# Patient Record
Sex: FEMALE | Race: BLACK OR AFRICAN AMERICAN | NOT HISPANIC OR LATINO | Employment: UNEMPLOYED | URBAN - METROPOLITAN AREA
[De-identification: names, ages, dates, MRNs, and addresses within clinical notes are randomized per-mention and may not be internally consistent; named-entity substitution may affect disease eponyms.]

---

## 2017-03-23 ENCOUNTER — TELEPHONE (OUTPATIENT)
Dept: OBSTETRICS AND GYNECOLOGY | Facility: CLINIC | Age: 32
End: 2017-03-23

## 2018-09-13 PROBLEM — O09.521 ELDERLY MULTIGRAVIDA IN FIRST TRIMESTER: Status: ACTIVE | Noted: 2018-09-13

## 2018-09-13 PROBLEM — O36.4XX0 IUFD AT 20 WEEKS OR MORE OF GESTATION: Status: ACTIVE | Noted: 2018-09-13

## 2018-11-15 PROBLEM — O09.299 PRIOR PREGNANCY WITH FETAL DEMISE: Status: ACTIVE | Noted: 2018-09-13

## 2018-11-15 PROBLEM — O10.919 CHRONIC HYPERTENSION AFFECTING PREGNANCY: Status: ACTIVE | Noted: 2018-11-15

## 2018-11-15 PROBLEM — O99.321 DRUG USE AFFECTING PREGNANCY IN FIRST TRIMESTER: Status: ACTIVE | Noted: 2018-11-15

## 2018-11-16 DIAGNOSIS — Z36.89 ENCOUNTER FOR FETAL ANATOMIC SURVEY: ICD-10-CM

## 2018-11-16 DIAGNOSIS — Z87.59 HISTORY OF FETAL DEMISE, NOT CURRENTLY PREGNANT: Primary | ICD-10-CM

## 2018-12-07 DIAGNOSIS — Z36.89 ENCOUNTER FOR FETAL ANATOMIC SURVEY: Primary | ICD-10-CM

## 2018-12-10 ENCOUNTER — HOSPITAL ENCOUNTER (OUTPATIENT)
Dept: PERINATAL CARE | Facility: HOSPITAL | Age: 33
Discharge: HOME | End: 2018-12-10
Attending: OBSTETRICS & GYNECOLOGY
Payer: MEDICAID

## 2018-12-10 VITALS — BODY MASS INDEX: 55.24 KG/M2 | WEIGHT: 293 LBS | DIASTOLIC BLOOD PRESSURE: 80 MMHG | SYSTOLIC BLOOD PRESSURE: 126 MMHG

## 2018-12-10 DIAGNOSIS — E66.01 MORBID OBESITY: ICD-10-CM

## 2018-12-10 DIAGNOSIS — Z87.59 HISTORY OF PLACENTA ABRUPTION: ICD-10-CM

## 2018-12-10 DIAGNOSIS — O26.90 PREGNANCY, COMPLICATIONS OF: Primary | ICD-10-CM

## 2018-12-10 DIAGNOSIS — O09.522 ADVANCED MATERNAL AGE IN MULTIGRAVIDA, SECOND TRIMESTER: ICD-10-CM

## 2018-12-10 DIAGNOSIS — O10.912 CHRONIC HYPERTENSION COMPLICATING OR REASON FOR CARE DURING PREGNANCY, SECOND TRIMESTER: ICD-10-CM

## 2018-12-10 DIAGNOSIS — Z36.89 ENCOUNTER FOR FETAL ANATOMIC SURVEY: ICD-10-CM

## 2018-12-10 PROCEDURE — 76811 OB US DETAILED SNGL FETUS: CPT | Mod: 26,,, | Performed by: OBSTETRICS & GYNECOLOGY

## 2018-12-10 PROCEDURE — 76811 OB US DETAILED SNGL FETUS: CPT

## 2018-12-10 PROCEDURE — 99204 OFFICE O/P NEW MOD 45 MIN: CPT | Mod: 25,TH,, | Performed by: OBSTETRICS & GYNECOLOGY

## 2018-12-10 RX ORDER — ALBUTEROL SULFATE 0.83 MG/ML
2.5 SOLUTION RESPIRATORY (INHALATION) EVERY 6 HOURS PRN
Status: ON HOLD | COMMUNITY
End: 2019-03-25

## 2018-12-11 NOTE — PROGRESS NOTES
"Indication  ========    Consultation. Fetal anatomy survey.    History  ======    Risk Factors  Details: Asthma  Details: Obesity  Details: hx placental abruption    Maternal Assessment  =================    Weight 137 kg  Weight (lb) 302 lb  BP syst 126 mmHg  BP diast 80 mmHg    Method  ======    Transabdominal ultrasound examination, 2D Color Doppler, Voluson E6. View: Suboptimal view: limited by fetal position.    Pregnancy  =========    Ahn pregnancy. Number of fetuses: 1.    Dating  ======    GA by "stated dating" 22 w + 5 d  SEBAS by "stated dating": 4/10/2019  Ultrasound examination on: 12/10/2018  GA by U/S based upon: AC, BPD, Femur, HC  GA by U/S 22 w + 5 d  SEBAS by U/S: 4/10/2019  Assigned: Dating performed on 12/10/2018, based on the external assessment  Assigned GA 22 w + 5 d  Assigned SEBAS: 4/10/2019    General Evaluation  ==============    Cardiac activity: present.  bpm.  Fetal movements: visualized.  Presentation: cephalic.  Placenta:  Placental site: anterior. Distance from internal cervical os 53 mm.  Umbilical cord: Cord vessels: 3 vessel cord. Cord insertion: placental insertion: normal.  Amniotic fluid: Amount of AF: normal amount.    Fetal Biometry  ============    Fetal Biometry  BPD 52.9 mm 22w 0d Hadlock  OFD 72.1 mm 24w 0d [place]  .7 mm 22w 2d Hadlock  .1 mm 23w 5d Hadlock  Femur 39.3 mm 22w 5d Hadlock  Cerebellum tr 23.8 mm 22w 6d Espinosa  CM 3.2 mm  Nuchal fold 4.07 mm  Humerus 35.1 mm 22w 0d [place]   g 51% Lawrence  Calculated by: Hadlock (BPD-HC-AC-FL)  EFW (lb) 1 lb  EFW (oz) 4 oz  Cephalic index 0.73  HC / AC 1.06  FL / BPD 0.74  FL / AC 0.21   bpm  Head / Face / Neck   4.6 mm    Fetal Anatomy  ============    Cranium: normal  Lateral ventricles: normal  Choroid plexus: normal  Midline falx: normal  Cavum septi pellucidi: normal  Cerebellum: normal  Cisterna " magna: normal  Vermis: normal  Neck: suboptimal  Lips: normal  Nose: normal  Profile: absent nasal bone, normal chin  RVOT: normal  LVOT: suboptimal  4-chamber view: 4-chamber normal, septum normal  Situs: normal  Aortic arch: suboptimal  Ductal arch: suboptimal  SVC: suboptimal  IVC: suboptimal  3-vessel view: normal  3-vessel-trachea view: suboptimal  Rt lung: normal  Lt lung: normal  Diaphragm: normal  Cord insertion: normal  Stomach: normal  Kidneys: normal  Bladder: normal  Genitals: normal  Liver: normal  Bowel: normal  Cervical spine: normal  Thoracic spine: normal  Lumbar spine: normal  Sacral spine: normal  Rt arm: normal  Lt arm: normal  Rt leg: normal  Lt leg: normal  Position of hands: normal  Position of feet: normal  Gender: female  Wants to know gender: yes    Maternal Structures  ===============    Uterus / Cervix  Approach: Transabdominal  Cervical length 42.1 mm  Ovaries / Tubes / Adnexa  Rt ovary: Suboptimal  Lt ovary: Suboptimal    Consultation  ==========    Type: CHTN/AMA/history of 24 week loss demise/PTD delivery.  Ms. Lopez is a 33y/o soon to be 34y/o  at 22 and 5 weeks based on LMP agreeing with 10 week scan who presents for consult  due to CHTN/AMA/history of 24 week IUFD and  delivery.  The patient was unaware of her diagnosis of CHTN-this appears to have been diagnosed this pregnancy.  Patient reports that she has had 3 or 4 miscarriages and she included the 24 week IUFD in this count. They have not been consecutive but  have been between pregnancies. It was unclear if a workup has been done. With respect to her 24 week delivery, the patient reports that she  did not know she was pregnant and had an episode of heavy bleeding which she thought was her menses and then had liquid noted. An IUFD  was diagnosed. She cannot remember if additional medications were given or if she labored on her own. She reported that they commented the  fetus had been  for an extended  time in utero based on appearance and the early delivery was thought to be her body's natural way of  passing the demised fetus. She was not on progesterone subsequent term deliveries. She reports she was told she had an abruption.    PMHx: CHTN:-diagnosed this pregnancy-no meds  Asthma: no regular meds-has used albuterol daily for a few weeks-no current wheezing-has PCP  who is aware she is pregnant-no  aspirin  sensitivity  AMA  PSHX: d and c, cholecystectomy  POBHx: 3 SAB (1 month of pregnancy), 24 week IUFD with later abruption, rupture of membranes and delivery  then:  40 week  6 pounds 14 ounces  40 week  7 pounds 12 ounces  39 week  6 pounds 13.6 ounces  Social: Denies smoking, alcohol, illicit drug use  Meds; PNV, albuterol  Family history birth defects: None-her maternal grandmother had 14 pregnancies and has had 7 living children and 7 miscarriages (no specific  reason), no sickle cell disease  Allergy: bactrim-hives    Quad negative  CF negative  hgb elec negative            1. IUP at 22 and 5    2. CHTN:She was counseled on maternal/fetal risks associated with CHTN during pregnancy. These include but are not limited to worsening  hypertension, superimposed preeclampsia (which may necessitate  delivery), placental abruption,  delivery and low but  increased likelihood of stroke, renal failure, and cardiac failure. Fetal risks include miscarriage, stillbirth, growth restriction, prematurity and   death. Risks are increased with evidence of renal dysfunction (serum creatinine >1.1 mg/dL or baseline proteinuria).  -We have scheduled patient for a follow up ultrasound 5 weeks for growth/suboptimal fetal anatomy.  - Serial ultrasounds for fetal growth starting at 24-28 weeks gestation.  - Baseline labs: CMP, CBC, Protein/creatinine ratio or 24 hour urine for protein and creatinine clearance are recommended. If the patient has a  urine p/c ratio of greater than or equal  to 0.3, then a 24 hour collection should be performed. If the patients creatinine is greater than or equal  to 1.1, this should be repeated each trimester.  -Glucose screen at this time secondary to obesity and if negative repeat at 24-28 wks gestation. Also recommend primary ob check hgba1c.  - Close monitoring of patient's blood pressures:  If BP < 160 mmHg systolic or 105 mmHg diastolic and no evidence of end-organ damage, no antihypertensive therapy suggested  If patient on antihypertensive medication, it is suggested that BP levels be maintained between 120 mmHg systolic and 80mmHg diastolic  and  160 mmHg systolic and 105 mmHg diastolic  - Fetal surveillance recommended twice weekly as per IUFD section  - Timing of delivery: 38 weeks with earlier delivery depending on clinical scenario  -With morbid obesity and CHTN recommend obtaining a baseline EKG and a maternal echocardiogram.  -Begin baby aspirin 81mg PO daily for preeclampsia prevention at 12-14 weeks gestation if not aspirin sensitive  - Opthalmic exam if any concerns.  - Postpartum, the patient should be placed on her prepregnancy regimen unless other therapy is thought to be optimal. For those newly  diagnosed during pregnancy without other comorbidities, antihypertensive therapy should be implemented for pressures greater than or equal to  150 systolic or greater than or equal to 100 diastolic.  The patient's blood pressures are currently controlled    3. History of IUFD: Based on the patient's account, the demise then lead to early delivery as likely a natural response to prolonged demise. If  there was truly an abruption, we discussed the recurrence risk of abruption in subsequent pregnancies. We also discussed that CHTN  increases the risk of abruption. Patient did not report an autopsy. She is aware of the option of NIPT and diagnostic testing. Recommend  primary ob obtain records of prior demise. Recommend serial growth ultrasounds. Recommend  twice weekly  fetal surveillance with  primary ob with BPP alternating with NST on Monday/Thursday or Tuesday/Friday schedule starting at 30 weeks. If BP is well controlled,  recommend delivery at 38 weeks. Earlier delivery may be required depending on clinical scenario. Obesity also increases the risk of adverse  pregnancy outcomes. Counseling was limited by lack of records. Based on the patient's account, she is not a candidate for transvaginal  cervical lengths and 17 OHPC injections. REcommend antiphospholipid antibody syndrome workup if this has not been done.    4. Absent nasal bone on ultrasound:We discussed in certain populations that this can be more commonly seen as a normal variant. However,  we did discuss the potential association with Trisomy 21. The patient had a low risk quad screen. We discussed the options of NIPT and  amniocentesis and the patient declined both.    5. AMA:We discussed the increased risk of chromosomal abnormalities with advanced maternal age and discussed the patients age related  risk of chromosomal abnormalities. We discussed screening strategies to assess for chromosomal abnormalities.  We reviewed the risks, benefits, alternatives, limitations, and sensitivities of the sequential screen. We reviewed the risks, benefits,  alternatives, limitations, and sensitivities of the quad screen. We discussed the risks, benefits, alternatives, limitations, sensitivities of NIPT.  We also discussed diagnostic strategies to assess for chromosomal abnormalities. The patient was advised of the risks, benefits, alternatives,  and limitations of amniocentesis and the 1 in 1000 risk of pregnancy related complications including pregnancy loss and declined. We  discussed the limitations of ultrasound in detecting chromosomal  abnormalities such as Trisomy 21. The patient elected to proceed with follow up ultrasound only.    -We also reviewed that AMA is associated with an increased risk  of adverse pregnancy outcomes such as miscarriage, ectopic pregnancy,  diabetes, hypertension, and other adverse outcomes. There is also an increased risk of stillbirth, particularly in women age 40 and above.    -Patient will need serial growth and  fetal surveillance due to comorbid issues.  -Recommend daily baby asa if no aggravation of asthma symptoms.      Also noted but not asked to consult on:    6. Asthma: Recommend primary ob confirm patient has follow up with PCP. Patient reports recent Albuterol use was due to cold weather and  typically resolves. Patient needs maintenance therapy for asthma if usage of albuterol is more than 2 times per week.    7. Multiple miscarriages: Recommend primary ob confirm patient has had APLS workup (lupus anticoagulant, beta 2 glycoprotein IgG and IgM,  and anticardiolipin IgG and IgM), as well as testing for diabetes and thyroid disease. If patient has not had parental karyotypes, these should  also be offered by primary ob. Uterine cavity assessment by primary ob could be considered outside of pregnancy.    8. Obesity-BMI is listed as 55 in EPIC. Obesity increases the risk of adverse pregnancy outcomes. If further consultation is desired regarding  obesity, please notify our office. Anesthesia consult would be recommended.    45 minutes was spent in face to face time with greater than half of that time spent in counseling and coordination of care.    Impression  =========    Ahn live intrauterine pregnancy.  Biometry agrees with the clinical dating.  Normal amniotic fluid volume by qualitative assessment.  Fetal nasal bone appears absent.  Some of the fetal anatomy was suboptimally visualized. The fetal anatomy that was seen appeared normal.  Placenta is anterior without previa.  Transabdominal cervical length is normal.    Recommendation  ==============    Follow up ultrasound in 5 weeks for growth/suboptimal fetal anatomy.  Patient declines  NIPT/amniocentesis.  Recommend primary ob order antiphospholipid antibody syndrome workup and offer recurrent pregnancy loss workup as above.  Recommend patient have baseline preeclampsia labs, baseline assessment of urine protein and EKG and maternal echo to be ordered by  primary ob. Recommend primary ob order GDM screening and hgba1c and if if reassuring, repeat diabetes screen at 24 to 28 weeks.  Recommend primary ob schedule anesthesia consult  Recommend patient take daily baby asa if no difficulties occur with her asthma.  Recommend primary ob confirm patient scheduled follow up with PCP due to recent increase in Albuterol use.  Recommend serial growth ultrasounds.  Recommend primary ob schedule twice weekly  fetal surveillance beginning at 30 weeks (BPP alternating with NST).  Delivery 38 weeks or earlier if indicated.  Recommend primary ob obtain records of demise.  See above consultation note.

## 2019-01-14 ENCOUNTER — HOSPITAL ENCOUNTER (OUTPATIENT)
Dept: PERINATAL CARE | Facility: HOSPITAL | Age: 34
Discharge: HOME | End: 2019-01-14
Attending: OBSTETRICS & GYNECOLOGY
Payer: MEDICAID

## 2019-01-14 VITALS — BODY MASS INDEX: 53.61 KG/M2 | SYSTOLIC BLOOD PRESSURE: 119 MMHG | DIASTOLIC BLOOD PRESSURE: 70 MMHG | WEIGHT: 293 LBS

## 2019-01-14 DIAGNOSIS — O26.90 PREGNANCY, COMPLICATIONS OF: Primary | ICD-10-CM

## 2019-01-14 DIAGNOSIS — O26.90 PREGNANCY, COMPLICATIONS OF: ICD-10-CM

## 2019-01-14 PROCEDURE — 76816 PR  US,PREGNANT UTERUS,F/U,TRANSABD APP: ICD-10-PCS | Mod: 26,,, | Performed by: OBSTETRICS & GYNECOLOGY

## 2019-01-14 PROCEDURE — 99212 OFFICE O/P EST SF 10 MIN: CPT | Mod: 25,TH,, | Performed by: OBSTETRICS & GYNECOLOGY

## 2019-01-14 PROCEDURE — 76816 OB US FOLLOW-UP PER FETUS: CPT

## 2019-01-14 PROCEDURE — 99212 PR OFFICE/OUTPT VISIT, EST, LEVL II, 10-19 MIN: ICD-10-PCS | Mod: 25,TH,, | Performed by: OBSTETRICS & GYNECOLOGY

## 2019-01-14 PROCEDURE — 76816 OB US FOLLOW-UP PER FETUS: CPT | Mod: 26,,, | Performed by: OBSTETRICS & GYNECOLOGY

## 2019-01-14 NOTE — PROGRESS NOTES
"Indication  ========    Follow-up evaluation of anatomy. Evaluation of fetal growth.    History  ======    Risk Factors  Details: Asthma  Details: Obesity  Details: hx placental abruption    Maternal Assessment  =================    Weight 133 kg  Weight (lb) 293 lb  BP syst 119 mmHg  BP diast 70 mmHg    Method  ======    2D Color Doppler, , Voluson E6, Transabdominal ultrasound examination. View: Sufficient.    Pregnancy  =========    Ahn pregnancy. Number of fetuses: 1.    Dating  ======    Cycle: regular cycle  GA by "stated dating" 27 w + 5 d  SEBAS by "stated dating": 4/10/2019  Ultrasound examination on: 1/14/2019  GA by U/S based upon: AC, BPD, Femur, HC  GA by U/S 27 w + 4 d  SEBAS by U/S: 4/11/2019  Assigned: Dating performed on 12/10/2018, based on the external assessment  Assigned GA 27 w + 5 d  Assigned SEBAS: 4/10/2019    General Evaluation  ==============    Cardiac activity: present.  bpm.  Fetal movements: visualized.  Presentation: cephalic.  Placenta: anterior.  Amniotic fluid: MVP 6.7 cm.    Fetal Biometry  ============    Fetal Biometry  BPD 68.2 mm 27w 3d Hadlock  OFD 94.1 mm 30w 3d [place]  .5 mm 28w 4d Hadlock  .2 mm 27w 0d Hadlock  Femur 51.5 mm 27w 4d Hadlock  EFW 1,065 g 39% Lawrence  Calculated by: Hadlock (BPD-HC-AC-FL)  EFW (lb) 2 lb  EFW (oz) 6 oz  Cephalic index 0.72  HC / AC 1.16  FL / BPD 0.76  FL / AC 0.23  MVP 6.7 cm   bpm    Fetal Anatomy  ============    Cranium: normal  Neck: normal  Profile: Absent nasal bone, chin normal  4-chamber view: documented previously  LVOT: normal  Aortic arch: normal  Ductal arch: normal  SVC: normal  IVC: normal  3-vessel-trachea view: normal  Stomach: normal  Kidneys: normal  Bladder: normal  Rt renal artery: views limited due to position, appears present  Lt renal artery: visualized  Gender: female  Wants to know gender: yes  Other: A full anatomic survey has been previously performed.    Consultation  ==========    Type: " Follow up.  No complaints  Patient aware of absent fetal nasal bone.  Hgba1c 4.7 and 1 hour glucola 72  Hgb 9.7, plt 308, cr 0.47, AST 16, ALT 22-recommend primary ob perform iron studies  No 24 hour urine or APLS labs noted or cardiology info noted.  Recommend primary ob review prior consult notes for detailed recommendations.    10 minutes was spent in face to face time with greater than half of that time spent in counseling and coordination of care.    Impression  =========    Ahn live intrauterine pregnancy.  Overall normal fetal growth.  Normal amniotic fluid volume.  The previously suboptimally visualized fetal anatomy was seen today and appeared normal.  Known absent fetal nasal bone.  The remainder of the limited fetal anatomy appeared normal.  Addendum: Right renal artery appears present-views limited by position-kidneys present on prior scan as well but image not saved.    Recommendation  ==============    Follow up ultrasound in 4 weeks for growth and fluid on ultrasound only day.  Serial growth recommended. Recommend at least one more growth scan be done through our MFM office.   fetal surveillance at 30 weeks through primary ob office as per prior consult.  Recommend primary ob review prior consult note for detailed recommendations.  Recommend primary ob perform iron studies.

## 2019-03-07 PROBLEM — O99.013 ANEMIA AFFECTING PREGNANCY IN THIRD TRIMESTER: Status: ACTIVE | Noted: 2019-03-07

## 2019-03-12 DIAGNOSIS — O26.90 PREGNANCY, COMPLICATIONS OF: Primary | ICD-10-CM

## 2019-03-19 ENCOUNTER — HOSPITAL ENCOUNTER (OUTPATIENT)
Dept: PERINATAL CARE | Facility: HOSPITAL | Age: 34
Discharge: HOME | End: 2019-03-19
Attending: OBSTETRICS & GYNECOLOGY
Payer: MEDICAID

## 2019-03-19 DIAGNOSIS — O26.90 PREGNANCY, COMPLICATIONS OF: ICD-10-CM

## 2019-03-19 DIAGNOSIS — Z87.59 HISTORY OF FETAL DEMISE, NOT CURRENTLY PREGNANT: ICD-10-CM

## 2019-03-19 DIAGNOSIS — Z36.89 ENCOUNTER FOR ULTRASOUND TO CHECK FETAL GROWTH: ICD-10-CM

## 2019-03-19 PROBLEM — Z34.90 PREGNANCY: Status: ACTIVE | Noted: 2019-03-19

## 2019-03-19 PROCEDURE — 76819 PR US, OB, FETAL BIOPHYSICAL, W/O NST: ICD-10-PCS | Mod: 26,,, | Performed by: OBSTETRICS & GYNECOLOGY

## 2019-03-19 PROCEDURE — 76819 FETAL BIOPHYS PROFIL W/O NST: CPT

## 2019-03-19 PROCEDURE — 76816 OB US FOLLOW-UP PER FETUS: CPT

## 2019-03-19 PROCEDURE — 76816 PR  US,PREGNANT UTERUS,F/U,TRANSABD APP: ICD-10-PCS | Mod: 26,,, | Performed by: OBSTETRICS & GYNECOLOGY

## 2019-03-19 PROCEDURE — 76819 FETAL BIOPHYS PROFIL W/O NST: CPT | Mod: 26,,, | Performed by: OBSTETRICS & GYNECOLOGY

## 2019-03-19 PROCEDURE — 76816 OB US FOLLOW-UP PER FETUS: CPT | Mod: 26,,, | Performed by: OBSTETRICS & GYNECOLOGY

## 2019-03-22 PROBLEM — Z34.90 PREGNANCY: Status: RESOLVED | Noted: 2019-03-19 | Resolved: 2019-03-22

## 2019-03-25 ENCOUNTER — HOSPITAL ENCOUNTER (INPATIENT)
Facility: HOSPITAL | Age: 34
LOS: 2 days | Discharge: HOME | End: 2019-03-27
Attending: OBSTETRICS & GYNECOLOGY | Admitting: OBSTETRICS & GYNECOLOGY
Payer: MEDICAID

## 2019-03-25 ENCOUNTER — ANESTHESIA (OUTPATIENT)
Dept: OBSTETRICS AND GYNECOLOGY | Facility: HOSPITAL | Age: 34
End: 2019-03-25
Payer: MEDICAID

## 2019-03-25 ENCOUNTER — ANESTHESIA EVENT (OUTPATIENT)
Dept: OBSTETRICS AND GYNECOLOGY | Facility: HOSPITAL | Age: 34
End: 2019-03-25
Payer: MEDICAID

## 2019-03-25 DIAGNOSIS — O09.299 PRIOR PREGNANCY WITH FETAL DEMISE: ICD-10-CM

## 2019-03-25 DIAGNOSIS — O10.919 CHRONIC HYPERTENSION AFFECTING PREGNANCY: ICD-10-CM

## 2019-03-25 DIAGNOSIS — O99.321 DRUG USE AFFECTING PREGNANCY IN FIRST TRIMESTER: ICD-10-CM

## 2019-03-25 DIAGNOSIS — E66.01 MORBID OBESITY: ICD-10-CM

## 2019-03-25 DIAGNOSIS — O09.523 ELDERLY MULTIGRAVIDA IN THIRD TRIMESTER: ICD-10-CM

## 2019-03-25 DIAGNOSIS — O99.013 ANEMIA AFFECTING PREGNANCY IN THIRD TRIMESTER: ICD-10-CM

## 2019-03-25 DIAGNOSIS — Z34.90 ENCOUNTER FOR INDUCTION OF LABOR: ICD-10-CM

## 2019-03-25 DIAGNOSIS — Z87.59 HISTORY OF PLACENTA ABRUPTION: ICD-10-CM

## 2019-03-25 LAB
ABO + RH BLD: NORMAL
BASOPHILS # BLD AUTO: 0.01 K/UL (ref 0–0.2)
BASOPHILS NFR BLD: 0.1 % (ref 0–1.9)
BLD GP AB SCN CELLS X3 SERPL QL: NORMAL
DIFFERENTIAL METHOD: ABNORMAL
EOSINOPHIL # BLD AUTO: 0 K/UL (ref 0–0.5)
EOSINOPHIL NFR BLD: 0.3 % (ref 0–8)
ERYTHROCYTE [DISTWIDTH] IN BLOOD BY AUTOMATED COUNT: 15.4 % (ref 11.5–14.5)
HCT VFR BLD AUTO: 34.3 % (ref 37–48.5)
HGB BLD-MCNC: 10.5 G/DL (ref 12–16)
LYMPHOCYTES # BLD AUTO: 2.3 K/UL (ref 1–4.8)
LYMPHOCYTES NFR BLD: 24.8 % (ref 18–48)
MCH RBC QN AUTO: 25.9 PG (ref 27–31)
MCHC RBC AUTO-ENTMCNC: 30.6 G/DL (ref 32–36)
MCV RBC AUTO: 85 FL (ref 82–98)
MONOCYTES # BLD AUTO: 0.8 K/UL (ref 0.3–1)
MONOCYTES NFR BLD: 8.7 % (ref 4–15)
NEUTROPHILS # BLD AUTO: 6 K/UL (ref 1.8–7.7)
NEUTROPHILS NFR BLD: 66.1 % (ref 38–73)
PLATELET # BLD AUTO: 308 K/UL (ref 150–350)
PMV BLD AUTO: 11.3 FL (ref 9.2–12.9)
RBC # BLD AUTO: 4.05 M/UL (ref 4–5.4)
WBC # BLD AUTO: 9.08 K/UL (ref 3.9–12.7)

## 2019-03-25 PROCEDURE — 25000003 PHARM REV CODE 250: Performed by: OBSTETRICS & GYNECOLOGY

## 2019-03-25 PROCEDURE — 11000001 HC ACUTE MED/SURG PRIVATE ROOM

## 2019-03-25 PROCEDURE — 27800517 HC TRAY,EPIDURAL-CONTINUOUS: Performed by: ANESTHESIOLOGY

## 2019-03-25 PROCEDURE — 72100002 HC LABOR CARE, 1ST 8 HOURS

## 2019-03-25 PROCEDURE — 86901 BLOOD TYPING SEROLOGIC RH(D): CPT

## 2019-03-25 PROCEDURE — 27200710 HC EPIDURAL INFUSION PUMP SET: Performed by: ANESTHESIOLOGY

## 2019-03-25 PROCEDURE — 63600175 PHARM REV CODE 636 W HCPCS: Performed by: OBSTETRICS & GYNECOLOGY

## 2019-03-25 PROCEDURE — 62326 NJX INTERLAMINAR LMBR/SAC: CPT | Performed by: ANESTHESIOLOGY

## 2019-03-25 PROCEDURE — 59409 PRA ETRICAL CARE,VAG DELIV ONLY: ICD-10-PCS | Mod: AA,,, | Performed by: ANESTHESIOLOGY

## 2019-03-25 PROCEDURE — 59409 OBSTETRICAL CARE: CPT | Mod: AA,,, | Performed by: ANESTHESIOLOGY

## 2019-03-25 PROCEDURE — S0020 INJECTION, BUPIVICAINE HYDRO: HCPCS | Performed by: ANESTHESIOLOGY

## 2019-03-25 PROCEDURE — 72200005 HC VAGINAL DELIVERY LEVEL II

## 2019-03-25 PROCEDURE — 25000003 PHARM REV CODE 250: Performed by: ANESTHESIOLOGY

## 2019-03-25 PROCEDURE — 85025 COMPLETE CBC W/AUTO DIFF WBC: CPT

## 2019-03-25 RX ORDER — OXYTOCIN/RINGER'S LACTATE 20/1000 ML
333 PLASTIC BAG, INJECTION (ML) INTRAVENOUS CONTINUOUS
Status: DISCONTINUED | OUTPATIENT
Start: 2019-03-25 | End: 2019-03-25

## 2019-03-25 RX ORDER — HYDROCODONE BITARTRATE AND ACETAMINOPHEN 10; 325 MG/1; MG/1
1 TABLET ORAL EVERY 4 HOURS PRN
Status: DISCONTINUED | OUTPATIENT
Start: 2019-03-25 | End: 2019-03-27 | Stop reason: HOSPADM

## 2019-03-25 RX ORDER — HYDROCORTISONE 25 MG/G
CREAM TOPICAL 3 TIMES DAILY PRN
Status: DISCONTINUED | OUTPATIENT
Start: 2019-03-25 | End: 2019-03-27 | Stop reason: HOSPADM

## 2019-03-25 RX ORDER — DOCUSATE SODIUM 100 MG/1
200 CAPSULE, LIQUID FILLED ORAL 2 TIMES DAILY PRN
Status: DISCONTINUED | OUTPATIENT
Start: 2019-03-25 | End: 2019-03-27 | Stop reason: HOSPADM

## 2019-03-25 RX ORDER — SODIUM CHLORIDE 9 MG/ML
INJECTION, SOLUTION INTRAVENOUS
Status: DISCONTINUED | OUTPATIENT
Start: 2019-03-25 | End: 2019-03-25

## 2019-03-25 RX ORDER — ONDANSETRON 8 MG/1
8 TABLET, ORALLY DISINTEGRATING ORAL EVERY 8 HOURS PRN
Status: DISCONTINUED | OUTPATIENT
Start: 2019-03-25 | End: 2019-03-27 | Stop reason: HOSPADM

## 2019-03-25 RX ORDER — OXYTOCIN/RINGER'S LACTATE 20/1000 ML
2 PLASTIC BAG, INJECTION (ML) INTRAVENOUS CONTINUOUS
Status: DISCONTINUED | OUTPATIENT
Start: 2019-03-25 | End: 2019-03-25

## 2019-03-25 RX ORDER — ALBUTEROL SULFATE 90 UG/1
2 AEROSOL, METERED RESPIRATORY (INHALATION) EVERY 4 HOURS PRN
Status: DISCONTINUED | OUTPATIENT
Start: 2019-03-25 | End: 2019-03-27 | Stop reason: HOSPADM

## 2019-03-25 RX ORDER — DIPHENHYDRAMINE HCL 25 MG
25 CAPSULE ORAL EVERY 4 HOURS PRN
Status: DISCONTINUED | OUTPATIENT
Start: 2019-03-25 | End: 2019-03-27 | Stop reason: HOSPADM

## 2019-03-25 RX ORDER — MISOPROSTOL 200 UG/1
600 TABLET ORAL
Status: DISCONTINUED | OUTPATIENT
Start: 2019-03-25 | End: 2019-03-25

## 2019-03-25 RX ORDER — BUPIVACAINE HYDROCHLORIDE 2.5 MG/ML
INJECTION, SOLUTION INFILTRATION; PERINEURAL
Status: COMPLETED | OUTPATIENT
Start: 2019-03-25 | End: 2019-03-25

## 2019-03-25 RX ORDER — HYDROCODONE BITARTRATE AND ACETAMINOPHEN 5; 325 MG/1; MG/1
1 TABLET ORAL EVERY 4 HOURS PRN
Status: DISCONTINUED | OUTPATIENT
Start: 2019-03-25 | End: 2019-03-27 | Stop reason: HOSPADM

## 2019-03-25 RX ORDER — OXYTOCIN/RINGER'S LACTATE 20/1000 ML
41.7 PLASTIC BAG, INJECTION (ML) INTRAVENOUS CONTINUOUS
Status: DISCONTINUED | OUTPATIENT
Start: 2019-03-25 | End: 2019-03-25

## 2019-03-25 RX ORDER — PRENATAL WITH FERROUS FUM AND FOLIC ACID 3080; 920; 120; 400; 22; 1.84; 3; 20; 10; 1; 12; 200; 27; 25; 2 [IU]/1; [IU]/1; MG/1; [IU]/1; MG/1; MG/1; MG/1; MG/1; MG/1; MG/1; UG/1; MG/1; MG/1; MG/1; MG/1
1 TABLET ORAL DAILY
Status: DISCONTINUED | OUTPATIENT
Start: 2019-03-26 | End: 2019-03-27 | Stop reason: HOSPADM

## 2019-03-25 RX ORDER — OXYTOCIN/RINGER'S LACTATE 20/1000 ML
41.65 PLASTIC BAG, INJECTION (ML) INTRAVENOUS CONTINUOUS
Status: DISPENSED | OUTPATIENT
Start: 2019-03-25 | End: 2019-03-26

## 2019-03-25 RX ORDER — FERROUS SULFATE 325(65) MG
325 TABLET, DELAYED RELEASE (ENTERIC COATED) ORAL DAILY
Status: DISCONTINUED | OUTPATIENT
Start: 2019-03-26 | End: 2019-03-27 | Stop reason: HOSPADM

## 2019-03-25 RX ORDER — ONDANSETRON 8 MG/1
8 TABLET, ORALLY DISINTEGRATING ORAL EVERY 8 HOURS PRN
Status: DISCONTINUED | OUTPATIENT
Start: 2019-03-25 | End: 2019-03-25

## 2019-03-25 RX ORDER — ACETAMINOPHEN 325 MG/1
650 TABLET ORAL EVERY 6 HOURS PRN
Status: DISCONTINUED | OUTPATIENT
Start: 2019-03-25 | End: 2019-03-27 | Stop reason: HOSPADM

## 2019-03-25 RX ORDER — DIPHENHYDRAMINE HYDROCHLORIDE 50 MG/ML
25 INJECTION INTRAMUSCULAR; INTRAVENOUS EVERY 4 HOURS PRN
Status: DISCONTINUED | OUTPATIENT
Start: 2019-03-25 | End: 2019-03-27 | Stop reason: HOSPADM

## 2019-03-25 RX ORDER — SODIUM CHLORIDE, SODIUM LACTATE, POTASSIUM CHLORIDE, CALCIUM CHLORIDE 600; 310; 30; 20 MG/100ML; MG/100ML; MG/100ML; MG/100ML
INJECTION, SOLUTION INTRAVENOUS CONTINUOUS
Status: DISCONTINUED | OUTPATIENT
Start: 2019-03-25 | End: 2019-03-25

## 2019-03-25 RX ORDER — IBUPROFEN 600 MG/1
600 TABLET ORAL EVERY 6 HOURS
Status: DISCONTINUED | OUTPATIENT
Start: 2019-03-25 | End: 2019-03-27 | Stop reason: HOSPADM

## 2019-03-25 RX ORDER — FENTANYL/BUPIVACAINE/NS/PF 2MCG/ML-.1
PLASTIC BAG, INJECTION (ML) INJECTION CONTINUOUS PRN
Status: DISCONTINUED | OUTPATIENT
Start: 2019-03-25 | End: 2019-03-25

## 2019-03-25 RX ORDER — FAMOTIDINE 20 MG/1
20 TABLET, FILM COATED ORAL 2 TIMES DAILY
Status: DISCONTINUED | OUTPATIENT
Start: 2019-03-25 | End: 2019-03-27 | Stop reason: HOSPADM

## 2019-03-25 RX ADMIN — HYDROCODONE BITARTRATE AND ACETAMINOPHEN 1 TABLET: 10; 325 TABLET ORAL at 09:03

## 2019-03-25 RX ADMIN — SODIUM CHLORIDE, SODIUM LACTATE, POTASSIUM CHLORIDE, AND CALCIUM CHLORIDE: .6; .31; .03; .02 INJECTION, SOLUTION INTRAVENOUS at 07:03

## 2019-03-25 RX ADMIN — SODIUM CHLORIDE, SODIUM LACTATE, POTASSIUM CHLORIDE, AND CALCIUM CHLORIDE 1000 ML: .6; .31; .03; .02 INJECTION, SOLUTION INTRAVENOUS at 11:03

## 2019-03-25 RX ADMIN — Medication 10 ML/HR: at 11:03

## 2019-03-25 RX ADMIN — IBUPROFEN 600 MG: 600 TABLET ORAL at 05:03

## 2019-03-25 RX ADMIN — BUPIVACAINE HYDROCHLORIDE 10 ML: 2.5 INJECTION, SOLUTION INFILTRATION; PERINEURAL at 11:03

## 2019-03-25 RX ADMIN — Medication 2 MILLI-UNITS/MIN: at 07:03

## 2019-03-25 RX ADMIN — FAMOTIDINE 20 MG: 20 TABLET ORAL at 09:03

## 2019-03-25 RX ADMIN — Medication 41.65 MILLI-UNITS/MIN: at 05:03

## 2019-03-25 NOTE — ANESTHESIA PROCEDURE NOTES
Epidural    Patient location during procedure: OB   Reason for block: primary anesthetic   Diagnosis: Labor Pain   Start time: 3/25/2019 11:41 AM  Timeout: 3/25/2019 11:40 AM  End time: 3/25/2019 11:55 AM  Staffing  Anesthesiologist: aTm Roldan MD  Preanesthetic Checklist  Completed: patient identified, site marked, surgical consent, pre-op evaluation, timeout performed, IV checked, risks and benefits discussed, monitors and equipment checked, anesthesia consent given, hand hygiene performed and patient being monitored  Preparation  Patient position: sitting  Prep: Betasept  Patient monitoring: Pulse Ox and Blood Pressure  Epidural  Skin Anesthetic: lidocaine 1%  Skin Wheal: 3 mL  Administration type: single shot  Approach: midline  Interspace: L3-4    Needle and Epidural Catheter  Needle type: Tuohy   Needle gauge: 17  Needle length: 3.5 inches  Needle insertion depth: 7 cm  Catheter type: springwound  Catheter size: 19 G  Catheter at skin depth: 13 cm  Test dose: 3 mL of lidocaine 1.5% with Epi 1-to-200,000  Additional Documentation: incremental injection, negative aspiration for heme and CSF, no paresthesia on injection, no signs/symptoms of IV or SA injection, no significant pain on injection and no significant complaints from patient  Needle localization: anatomical landmarks

## 2019-03-25 NOTE — L&D DELIVERY NOTE
Vaginal Delivery Note    Physician: Dasia Victoria MD    Pre-procedure diagnosis:   1. IUP @ 37w5d  2. CHTN  3. Obesity  4. H/o fetal demise 2/2 placenta abruption (G1)  5. Anemia - chronic, iron deficiency    Post-procedure diagnosis: Same    Anesthesia: Epidural    Estimated blood loss: 400 cc    Lacerations: none    Findings: Viable female infant in the vertex position with Apgars 9/9, and weight not yet recorded    Delivery Note: Patient called out that she felt vaginal pressure.  She was checked and found to be completely dilated at 2+ station.  With instruction, she pushed and the infant's head delivered atraumatically followed by anterior shoulder and rest of the infant's body without any difficulty.  The infant was placed on the mother's abdomen and its cord was clamped x2 and cut.  The infant's mouth and nose were bulb suctioned and it was dried and stimulated with warm towels.  Cord blood was sampled.  The placenta was then delivered spontaneously, inspected and found to be intact.    The patient tolerated the procedure well.      Complications: None    Additional medications: Pitocin IV

## 2019-03-25 NOTE — H&P
History and Physical  Obstetrics      CC:IOL    HPI: Donald Lopez is a 33 y.o.  female at 37w5d presents for IOL for h/o IUFD and cHTN  Denies vb, d/c, LOF. States Good fetal movement      Patient Active Problem List   Diagnosis    Prior pregnancy with fetal demise    Drug use affecting pregnancy in first trimester    Chronic hypertension affecting pregnancy    Morbid obesity    Elderly multigravida in third trimester    History of placenta abruption    Anemia affecting pregnancy in third trimester    Encounter for induction of labor     PTA Medications   Medication Sig    albuterol (ACCUNEB) 0.63 mg/3 mL Nebu Inhale 1 ampule into the lungs.    albuterol (PROVENTIL) 2.5 mg /3 mL (0.083 %) nebulizer solution Take 2.5 mg by nebulization every 6 (six) hours as needed for Wheezing. Rescue    ferrous sulfate (FEOSOL) 325 mg (65 mg iron) Tab tablet Take 1 tablet (325 mg total) by mouth once daily.    multivitamin (THERAGRAN) per tablet Take 1 tablet by mouth.    prenatal vit,pinky 74-iron-folic 27 mg iron- 1 mg Tab Take 1 tablet by mouth once daily.    ranitidine (ZANTAC) 300 MG tablet Take 1 tablet (300 mg total) by mouth nightly.     Review of patient's allergies indicates:   Allergen Reactions    Sulfamethoxazole-trimethoprim Itching      Past Medical History:   Diagnosis Date    Asthma      Past Surgical History:   Procedure Laterality Date    CHOLECYSTECTOMY      DILATION AND CURETTAGE OF UTERUS  2015    GALLBLADDER SURGERY  2003     Family History   Problem Relation Age of Onset    Heart failure Father     Diabetes Father     Hypertension Father      Social History     Tobacco Use    Smoking status: Never Smoker    Smokeless tobacco: Never Used   Substance Use Topics    Alcohol use: No    Drug use: No        ROS: systems reviewed and are negative.     Vital Signs (Most Recent)  Temp:  [98.1 °F (36.7 °C)-98.5 °F (36.9 °C)] 98.5 °F (36.9 °C)  Pulse:  [67-86] 68  Resp:  [18-20]  18  BP: (104-116)/(51-65) 114/55    Physical Exam:  General:  Normal, alert and oriented   CV:  Regular rate   Lungs: Normal respiratory effort   Abd: Gravid, Nondistended, Nt,  No Guarding/rebounding   Extremeties: Nt, no significant assymetric swelling           Uterine Size:  c/w dates   Presentations:  vertex   FHT: reviewed   Pelvis: NEFG   SVE:  6-7/80/-2 AROM clear     Laboratory:  Recent Labs   Lab 19  0642   WBC 9.08   HGB 10.5*   HCT 34.3*        No results for input(s): NA, K, CL, CO2, BUN, CREATININE, CALCIUM, PROT, BILITOT, ALKPHOS, ALT, AST, GLUCOSE in the last 168 hours.    Invalid input(s): LABALBU      ASSESSMENT/PLAN:     33 y.o.  with IUP at 37w5d gestation.  IOL- continue pit  cHTN  H/o IUFD  AMA  MO

## 2019-03-25 NOTE — ANESTHESIA PREPROCEDURE EVALUATION
03/25/2019  Donald Lopez is a 33 y.o., female.    Anesthesia Evaluation    I have reviewed the Patient Summary Reports.    I have reviewed the Nursing Notes.   I have reviewed the Medications.     Review of Systems  Anesthesia Hx:  No problems with previous Anesthesia  History of prior surgery of interest to airway management or planning: Denies Family Hx of Anesthesia complications.   Denies Personal Hx of Anesthesia complications.   Social:  Non-Smoker    Cardiovascular:   Exercise tolerance: good Hypertension    Pulmonary:   Asthma asymptomatic        Physical Exam  General:  Well nourished, Morbid Obesity    Airway/Jaw/Neck:  Airway Findings: Mouth Opening: Normal General Airway Assessment: Adult  Mallampati: III  TM Distance: Normal, at least 6 cm                 Anesthesia Plan  Type of Anesthesia, risks & benefits discussed:  Anesthesia Type:  epidural  Patient's Preference:   Intra-op Monitoring Plan:   Intra-op Monitoring Plan Comments:   Post Op Pain Control Plan: epidural analgesia, per primary service following discharge from PACU and multimodal analgesia  Post Op Pain Control Plan Comments:   Induction:    Beta Blocker:  Patient is not currently on a Beta-Blocker (No further documentation required).       Informed Consent: Patient understands risks and agrees with Anesthesia plan.  Questions answered. Anesthesia consent signed with patient.  ASA Score: 2     Day of Surgery Review of History & Physical: I have interviewed and examined the patient. I have reviewed the patient's H&P dated: 03/18/19. There are no significant changes.  H&P update referred to the provider.  H&P completed by Anesthesiologist.       Ready For Surgery From Anesthesia Perspective.

## 2019-03-25 NOTE — LACTATION NOTE
"   03/25/19 1520   Pain/Comfort/Sleep   Pain Body Location - Side Bilateral   Pain Body Location breast   Pain Rating: Rest 0 - no pain   Breasts WDL   Breast WDL WDL   Maternal Feeding Assessment   Maternal Emotional State relaxed;assist needed   Signs of Milk Transfer audible swallow;infant jaw motion present   Infant Positioning cross-cradle   Latch Assistance yes   Reproductive Interventions   Breastfeeding Assistance assisted with positioning;infant latch-on verified;infant suck/swallow verified   Breastfeeding Support encouragement provided;lactation counseling provided     Minimal assist with position and latch to left breast in cradle hold; audible swallows noted.   Basic breastfeeding instructions given and Mother's Breastfeeding Guide reviewed.  Encouraged to call for assist prn.  States "understand" and verbalized appropriate recall.    "

## 2019-03-26 LAB
BASOPHILS # BLD AUTO: 0.01 K/UL (ref 0–0.2)
BASOPHILS NFR BLD: 0.1 % (ref 0–1.9)
DIFFERENTIAL METHOD: ABNORMAL
EOSINOPHIL # BLD AUTO: 0 K/UL (ref 0–0.5)
EOSINOPHIL NFR BLD: 0.3 % (ref 0–8)
ERYTHROCYTE [DISTWIDTH] IN BLOOD BY AUTOMATED COUNT: 15.5 % (ref 11.5–14.5)
HCT VFR BLD AUTO: 26.4 % (ref 37–48.5)
HGB BLD-MCNC: 8.1 G/DL (ref 12–16)
LYMPHOCYTES # BLD AUTO: 2.1 K/UL (ref 1–4.8)
LYMPHOCYTES NFR BLD: 17.4 % (ref 18–48)
MCH RBC QN AUTO: 26.2 PG (ref 27–31)
MCHC RBC AUTO-ENTMCNC: 30.7 G/DL (ref 32–36)
MCV RBC AUTO: 85 FL (ref 82–98)
MONOCYTES # BLD AUTO: 1.2 K/UL (ref 0.3–1)
MONOCYTES NFR BLD: 9.5 % (ref 4–15)
NEUTROPHILS # BLD AUTO: 8.9 K/UL (ref 1.8–7.7)
NEUTROPHILS NFR BLD: 72.7 % (ref 38–73)
PLATELET # BLD AUTO: 268 K/UL (ref 150–350)
PMV BLD AUTO: 10.7 FL (ref 9.2–12.9)
RBC # BLD AUTO: 3.09 M/UL (ref 4–5.4)
WBC # BLD AUTO: 12.16 K/UL (ref 3.9–12.7)

## 2019-03-26 PROCEDURE — 25000003 PHARM REV CODE 250: Performed by: OBSTETRICS & GYNECOLOGY

## 2019-03-26 PROCEDURE — 11000001 HC ACUTE MED/SURG PRIVATE ROOM

## 2019-03-26 PROCEDURE — 94761 N-INVAS EAR/PLS OXIMETRY MLT: CPT

## 2019-03-26 PROCEDURE — 36415 COLL VENOUS BLD VENIPUNCTURE: CPT

## 2019-03-26 PROCEDURE — 85025 COMPLETE CBC W/AUTO DIFF WBC: CPT

## 2019-03-26 RX ADMIN — FAMOTIDINE 20 MG: 20 TABLET ORAL at 09:03

## 2019-03-26 RX ADMIN — HYDROCODONE BITARTRATE AND ACETAMINOPHEN 1 TABLET: 5; 325 TABLET ORAL at 09:03

## 2019-03-26 RX ADMIN — Medication 1 TABLET: at 09:03

## 2019-03-26 RX ADMIN — IBUPROFEN 600 MG: 600 TABLET ORAL at 04:03

## 2019-03-26 RX ADMIN — HYDROCODONE BITARTRATE AND ACETAMINOPHEN 1 TABLET: 10; 325 TABLET ORAL at 09:03

## 2019-03-26 RX ADMIN — IBUPROFEN 600 MG: 600 TABLET ORAL at 11:03

## 2019-03-26 RX ADMIN — FERROUS SULFATE TAB EC 325 MG (65 MG FE EQUIVALENT) 325 MG: 325 (65 FE) TABLET DELAYED RESPONSE at 09:03

## 2019-03-26 RX ADMIN — IBUPROFEN 600 MG: 600 TABLET ORAL at 05:03

## 2019-03-26 NOTE — LACTATION NOTE
03/26/19 0740   Maternal Assessment   Breast Shape Bilateral:;pendulous   Breast Density Bilateral:;soft   Areola Bilateral:;dense;elastic   Nipples Right:;flat;graspable;Left:;everted   Maternal Infant Feeding   Maternal Emotional State assist needed;relaxed   Infant Positioning clutch/football;cross-cradle   Signs of Milk Transfer audible swallow;infant jaw motion present   Pain with Feeding no   Latch Assistance yes   Breastfeeding Supplementation   Infant Indication for Supplementation maternal request   mother with baby ready to breastfeed -mother had asked for formula at this feeding -reinforced risks of formula and artificial nipples as has been using a pacifier also -willing to breastfeed now -baby skin to skin and moved to breast in football hold -baby latching on and off to flat large nipple -pushes out with tongue while sucking and comes off -baby on and off for 5  Minutes and mother ready to try left side -latches baby independently on left side in cross cradle hold and baby sustain latch for longer but continues to push off after a few minutes of sucking -after a few attempts baby sustains latch better -reinforced with mother potential for continued latch problems with use of artificial nipples -states understanding of information and encouraged to call for any assistance

## 2019-03-26 NOTE — PLAN OF CARE
Problem: Adult Inpatient Plan of Care  Goal: Plan of Care Review  Pt progressing well. NAD noted. VSS. Pain well controlled. POC discussed with pt. Understanding verbalized.

## 2019-03-26 NOTE — PROGRESS NOTES
No complaints.  Bleeding and pain are controlled.     Vital Signs (Most Recent):  Temp: 96.2 °F (35.7 °C) (03/26/19 0730)  Pulse: 63 (03/26/19 0730)  Resp: 16 (03/26/19 0730)  BP: 99/63 (03/26/19 0730)  SpO2: 100 % (03/25/19 1525)    Vital Signs Range (Last 24H):  Temp:  [96.2 °F (35.7 °C)-97.8 °F (36.6 °C)]   Pulse:  []   Resp:  [16-20]   BP: ()/(43-80)   SpO2:  [79 %-100 %]     Gen - NAD  Uterus - firm, non-tender      Recent Labs   Lab 03/26/19  0659   WBC 12.16   HGB 8.1*   HCT 26.4*          A/P PPD #1  CHTN - BP normal  REBECCA - chronic with expected worsening 2/2 delivery.  Monitor for any symptoms.  Routine care

## 2019-03-27 VITALS
OXYGEN SATURATION: 100 % | HEIGHT: 62 IN | RESPIRATION RATE: 20 BRPM | BODY MASS INDEX: 52.81 KG/M2 | DIASTOLIC BLOOD PRESSURE: 56 MMHG | WEIGHT: 287 LBS | HEART RATE: 69 BPM | SYSTOLIC BLOOD PRESSURE: 114 MMHG | TEMPERATURE: 98 F

## 2019-03-27 PROCEDURE — 90471 IMMUNIZATION ADMIN: CPT | Performed by: OBSTETRICS & GYNECOLOGY

## 2019-03-27 PROCEDURE — 90715 TDAP VACCINE 7 YRS/> IM: CPT | Performed by: OBSTETRICS & GYNECOLOGY

## 2019-03-27 PROCEDURE — 63600175 PHARM REV CODE 636 W HCPCS: Performed by: OBSTETRICS & GYNECOLOGY

## 2019-03-27 PROCEDURE — 25000003 PHARM REV CODE 250: Performed by: OBSTETRICS & GYNECOLOGY

## 2019-03-27 RX ORDER — FERROUS SULFATE 325(65) MG
325 TABLET ORAL 2 TIMES DAILY
Qty: 60 TABLET | Refills: 5 | Status: SHIPPED | OUTPATIENT
Start: 2019-03-27 | End: 2019-09-30

## 2019-03-27 RX ORDER — HYDROCODONE BITARTRATE AND ACETAMINOPHEN 5; 325 MG/1; MG/1
1 TABLET ORAL EVERY 6 HOURS PRN
Qty: 20 TABLET | Refills: 0 | Status: SHIPPED | OUTPATIENT
Start: 2019-03-27 | End: 2019-09-30

## 2019-03-27 RX ORDER — IBUPROFEN 600 MG/1
600 TABLET ORAL EVERY 6 HOURS PRN
Qty: 60 TABLET | Refills: 0 | Status: SHIPPED | OUTPATIENT
Start: 2019-03-27 | End: 2019-09-30

## 2019-03-27 RX ADMIN — CLOSTRIDIUM TETANI TOXOID ANTIGEN (FORMALDEHYDE INACTIVATED), CORYNEBACTERIUM DIPHTHERIAE TOXOID ANTIGEN (FORMALDEHYDE INACTIVATED), BORDETELLA PERTUSSIS TOXOID ANTIGEN (GLUTARALDEHYDE INACTIVATED), BORDETELLA PERTUSSIS FILAMENTOUS HEMAGGLUTININ ANTIGEN (FORMALDEHYDE INACTIVATED), BORDETELLA PERTUSSIS PERTACTIN ANTIGEN, AND BORDETELLA PERTUSSIS FIMBRIAE 2/3 ANTIGEN 0.5 ML: 5; 2; 2.5; 5; 3; 5 INJECTION, SUSPENSION INTRAMUSCULAR at 12:03

## 2019-03-27 RX ADMIN — Medication 1 TABLET: at 09:03

## 2019-03-27 RX ADMIN — IBUPROFEN 600 MG: 600 TABLET ORAL at 12:03

## 2019-03-27 RX ADMIN — FAMOTIDINE 20 MG: 20 TABLET ORAL at 09:03

## 2019-03-27 RX ADMIN — FERROUS SULFATE TAB EC 325 MG (65 MG FE EQUIVALENT) 325 MG: 325 (65 FE) TABLET DELAYED RESPONSE at 09:03

## 2019-03-27 RX ADMIN — IBUPROFEN 600 MG: 600 TABLET ORAL at 05:03

## 2019-03-27 NOTE — LACTATION NOTE
"   03/27/19 0824   Pain/Comfort/Sleep   Pain Body Location - Side Bilateral   Pain Body Location breast   Pain Rating (0-10): Activity 2   Pain Management Interventions other (see comments)  (lanolin)   Breasts WDL   Breast WDL WDL   Maternal Feeding Assessment   Maternal Emotional State independent   Latch Assistance no   Reproductive Interventions   Breastfeeding Support encouragement provided;lactation counseling provided     Currently bottle feeding baby at this time.  Encouraged breastfeeding on demand, 8 -12 times in 24 hours.  Call for assist prn.  Requests to offer bottles of formula prn.  Discussed risks associated with formula feeding on the course of breastfeeding.  Breastfeeding discharge instructions given with review of Mother's Breastfeeding Guide and Resource List.  Engorgement precautions discussed and milk supply/breast emptying explained.  Referred to [facility] for electric pump.  Encouraged to call hotline # prn.  States "understand" and verbalized appropriate recall.    "

## 2019-03-27 NOTE — PHYSICIAN QUERY
"PT Name: Donald Lopez  MR #: [id number]    Physician Query Form - Hematology Clarification      CDS/: JUAN ALBERTO Paul,[email or web address]         Contact information:[email or web address]    This form is a permanent document in the medical record.      Query Date: March 27, 2019    By submitting this query, we are merely seeking further clarification of documentation. Please utilize your independent clinical judgment when addressing the question(s) below.    The Medical record contains the following:   Indicators  Supporting Clinical Findings Location in Medical Record   X "Anemia" documented REBECCA - chronic with expected worsening 2/2 delivery. OB Progress note 3/26@805am   X H & H = Hgb=8.1-10.5  Hct=26.4-34.3 LAB 3/25-3/26    BP =                     HR=      "GI bleeding" documented     X Acute bleeding (Non GI site) Estimated blood loss: 400 cc   L&D Delivery note 3/25    Transfusion(s)     X Treatment: Monitor for any symptoms.      ferrous sulfate EC tablet 325 mg   OB Progress note 3/26@805am    MAR 3/26    Other:        Provider, please specify diagnosis or diagnoses associated with above clinical findings.    [  ] Acute blood loss anemia     [  ] Other Hematological Diagnosis (please specify):     [  ] Clinically Undetermined       Please document in your progress notes daily for the duration of treatment, until resolved, and include in your discharge summary.     Patient with iron deficiency, chronic anemia and had acute blood loss.                                                                                                     "

## 2019-03-27 NOTE — DISCHARGE SUMMARY
33 y.o.   OB History        8    Para   5    Term   4       1    AB   3    Living   4       SAB   3    TAB        Ectopic        Multiple   0    Live Births   4             admitted for Vaginal delivery complicated by precipitous labor abd delivery.  Postpartum course unremarkable.   D/c home  Admit date 3/25/2019  5:30 AM  D/c date 2019  Discharge instructions - pelvic rest/ regular diet  Discharge followup 6 weeks for vaginal delivery  Meds listed seperately

## 2019-03-29 ENCOUNTER — TELEPHONE (OUTPATIENT)
Dept: OBSTETRICS AND GYNECOLOGY | Facility: HOSPITAL | Age: 34
End: 2019-03-29

## 2019-03-29 NOTE — TELEPHONE ENCOUNTER
Spoke to pt who states baby continues having trouble latching especially to the right side -but milk is filling in and when she does latch she softens breast well -if she doesn't latch mother is supplementing with about 2 ounces of formula -encouraged milk expression when baby does not latch -has appt to get [facility]IC pump on Tuesday -reinforced hand expression with warm compresses and massage to keep breast comfortable and maintain supply  until able to get pump -does have a nipple shield and tried it but does not feel like she was using it correctly -given tips over the phone and referred to instruction guide to see how to apply -states okay will try measures discussed and has no other concerns at this time

## 2019-03-31 ENCOUNTER — TELEPHONE (OUTPATIENT)
Dept: OBSTETRICS AND GYNECOLOGY | Facility: HOSPITAL | Age: 34
End: 2019-03-31

## 2019-04-01 ENCOUNTER — TELEPHONE (OUTPATIENT)
Dept: OBSTETRICS AND GYNECOLOGY | Facility: HOSPITAL | Age: 34
End: 2019-04-01

## 2019-04-01 NOTE — TELEPHONE ENCOUNTER
Spoke to pt who states baby breastfeeding a little better emptying one side well but not the other and they fill back up quickly -has appt with WIC tomorrow and hopes to get a pump to help keep breasts soft and comfortable -baby did not stool yesterday and spoke to pediatrician who wants mother to not give formula for next 24 hours and only breast milk to see if that will help because baby is having many wet diapers -has no concerns for us and will call if she needs us

## 2019-04-05 NOTE — ANESTHESIA POSTPROCEDURE EVALUATION
Anesthesia Post Evaluation    Patient: Donald John    Procedure(s) Performed: * No procedures listed *    Final Anesthesia Type: epidural  Patient location during evaluation: labor & delivery  Patient participation: Yes- Able to Participate  Level of consciousness: awake and alert, awake and oriented  Post-procedure vital signs: reviewed and stable  Pain management: adequate  Airway patency: patent  PONV status at discharge: No PONV  Anesthetic complications: no      Cardiovascular status: blood pressure returned to baseline, hemodynamically stable and stable  Respiratory status: unassisted  Hydration status: euvolemic  Follow-up not needed.

## 2019-06-05 PROBLEM — O99.013 ANEMIA AFFECTING PREGNANCY IN THIRD TRIMESTER: Status: RESOLVED | Noted: 2019-03-07 | Resolved: 2019-06-05

## 2019-06-05 PROBLEM — O09.299 PRIOR PREGNANCY WITH FETAL DEMISE: Status: RESOLVED | Noted: 2018-09-13 | Resolved: 2019-06-05

## 2019-09-30 PROBLEM — O99.321 DRUG USE AFFECTING PREGNANCY IN FIRST TRIMESTER: Status: RESOLVED | Noted: 2018-11-15 | Resolved: 2019-09-30

## 2019-09-30 PROBLEM — O10.919 CHRONIC HYPERTENSION AFFECTING PREGNANCY: Status: RESOLVED | Noted: 2018-11-15 | Resolved: 2019-09-30

## 2019-09-30 PROBLEM — Z87.59 HISTORY OF PREGNANCY INDUCED HYPERTENSION: Status: ACTIVE | Noted: 2019-04-08

## 2019-09-30 PROBLEM — Z34.90 ENCOUNTER FOR INDUCTION OF LABOR: Status: RESOLVED | Noted: 2019-03-25 | Resolved: 2019-09-30
